# Patient Record
Sex: FEMALE | Race: WHITE | URBAN - METROPOLITAN AREA
[De-identification: names, ages, dates, MRNs, and addresses within clinical notes are randomized per-mention and may not be internally consistent; named-entity substitution may affect disease eponyms.]

---

## 2018-10-16 ENCOUNTER — TRANSFERRED RECORDS (OUTPATIENT)
Dept: HEALTH INFORMATION MANAGEMENT | Facility: CLINIC | Age: 53
End: 2018-10-16

## 2018-12-05 ENCOUNTER — OFFICE VISIT (OUTPATIENT)
Dept: OPHTHALMOLOGY | Facility: CLINIC | Age: 53
End: 2018-12-05
Attending: OPHTHALMOLOGY
Payer: COMMERCIAL

## 2018-12-05 DIAGNOSIS — H35.353 CYSTOID MACULAR DEGENERATION OF BOTH EYES: ICD-10-CM

## 2018-12-05 DIAGNOSIS — H35.52 RETINITIS PIGMENTOSA OF BOTH EYES: ICD-10-CM

## 2018-12-05 DIAGNOSIS — H35.50: ICD-10-CM

## 2018-12-05 DIAGNOSIS — H35.50: Primary | ICD-10-CM

## 2018-12-05 DIAGNOSIS — H35.52 RETINITIS PIGMENTOSA OF BOTH EYES: Primary | ICD-10-CM

## 2018-12-05 PROCEDURE — 40000269 ZZH STATISTIC NO CHARGE FACILITY FEE: Mod: ZF

## 2018-12-05 PROCEDURE — 92275 FFERG OU (BOTH EYES): CPT | Mod: ZF | Performed by: OPHTHALMOLOGY

## 2018-12-05 PROCEDURE — 92134 CPTRZ OPH DX IMG PST SGM RTA: CPT | Mod: ZF | Performed by: OPHTHALMOLOGY

## 2018-12-05 PROCEDURE — G0463 HOSPITAL OUTPT CLINIC VISIT: HCPCS | Mod: ZF,25

## 2018-12-05 PROCEDURE — 92250 FUNDUS PHOTOGRAPHY W/I&R: CPT | Mod: ZF | Performed by: OPHTHALMOLOGY

## 2018-12-05 RX ORDER — DORZOLAMIDE HCL 20 MG/ML
1 SOLUTION/ DROPS OPHTHALMIC 2 TIMES DAILY
Qty: 1 BOTTLE | Refills: 11 | Status: SHIPPED | OUTPATIENT
Start: 2018-12-05

## 2018-12-05 RX ORDER — ESCITALOPRAM OXALATE 20 MG/1
20 TABLET ORAL DAILY
Refills: 3 | COMMUNITY
Start: 2018-10-26

## 2018-12-05 ASSESSMENT — VISUAL ACUITY
OS_CC: 20/25
METHOD: SNELLEN - LINEAR
OD_CC+: -1
OD_CC+: -1
OS_CC+: +2
OD_CC: 20/25
OD_CC: 20/25
CORRECTION_TYPE: GLASSES
OS_CC+: +2
OS_CC: 20/25
METHOD: SNELLEN - LINEAR

## 2018-12-05 ASSESSMENT — CUP TO DISC RATIO
OD_RATIO: 0.35
OS_RATIO: 0.4

## 2018-12-05 ASSESSMENT — REFRACTION_WEARINGRX
OS_AXIS: 085
SPECS_TYPE: TRIFOCAL
OD_CYLINDER: +1.50
OD_ADD: +3.00
OS_ADD: +3.00
OS_ADD: +3.00
OD_AXIS: 060
SPECS_TYPE: TRIFOCAL
OD_ADD: +3.00
OD_SPHERE: -2.00
OS_CYLINDER: +1.00
OS_SPHERE: -1.75
OS_CYLINDER: +1.00
OD_SPHERE: -2.00
OS_AXIS: 085
OD_AXIS: 060
OS_SPHERE: -1.75
OD_CYLINDER: +1.50

## 2018-12-05 ASSESSMENT — CONF VISUAL FIELD
OD_NORMAL: 1
METHOD: COUNTING FINGERS
OS_NORMAL: 1

## 2018-12-05 ASSESSMENT — TONOMETRY
OS_IOP_MMHG: 18
IOP_METHOD: TONOPEN
OD_IOP_MMHG: 19

## 2018-12-05 ASSESSMENT — EXTERNAL EXAM - LEFT EYE: OS_EXAM: NORMAL

## 2018-12-05 ASSESSMENT — EXTERNAL EXAM - RIGHT EYE: OD_EXAM: NORMAL

## 2018-12-05 ASSESSMENT — SLIT LAMP EXAM - LIDS
COMMENTS: DERMATOCHALASIS
COMMENTS: DERMATOCHALASIS

## 2018-12-05 NOTE — MR AVS SNAPSHOT
After Visit Summary   12/5/2018    Deidra Garcia    MRN: [id number]           Patient Information     Date Of Birth          1965        Visit Information        Provider Department      12/5/2018 2:30 PM [facility]        Today's Diagnoses     Retinitis pigmentosa of both eyes           Follow-ups after your visit        Future tests that were ordered for you today     Open Future Orders        Priority Expected Expires Ordered    Zhang VF OU Routine  6/5/2020 12/5/2018            Who to contact     Please call your clinic at [phone] to:    Ask questions about your health    Make or cancel appointments    Discuss your medicines    Learn about your test results    Speak to your doctor            Additional Information About Your Visit        [employer] Information     [email or web address] gives you secure access to your electronic health record. If you see a primary care provider, you can also send messages to your care team and make appointments. If you have questions, please call your primary care clinic.  If you do not have a primary care provider, please call [phone] and they will assist you.      [email or web address] is an electronic gateway that provides easy, online access to your medical records. With [email or web address], you can request a clinic appointment, read your test results, renew a prescription or communicate with your care team.     To access your existing account, please contact your [facility] or call [phone] for assistance.        Care EveryWhere ID     This is your Care EveryWhere ID. This could be used by other organizations to access your [place] medical records  [id number]         Blood Pressure from Last 3 Encounters:   No data found for BP    Weight from Last 3 Encounters:   No data found for Wt              Today, you had the following     No orders found for display         Today's Medication Changes          These changes are  accurate as of 12/5/18  4:28 PM.  If you have any questions, ask your nurse or doctor.               Start taking these medicines.        Dose/Directions    dorzolamide 2 % ophthalmic solution   Commonly known as:  TRUSOPT   Used for:  Cystoid macular degeneration of both eyes, Retinitis pigmentosa of both eyes   Started by:  Brianna Thompson MD        Dose:  1 drop   Place 1 drop into the right eye 2 times daily   Quantity:  1 Bottle   Refills:  11            Where to get your medicines      These medications were sent to [place] [address] [place] [address] [address]     Phone:  [phone]     dorzolamide 2 % ophthalmic solution                Primary Care Provider Office Phone # Fax #    Nicky WOODARD KAREN Quevedo [phone]       [place] [address] [place] [id number]        Equal Access to Services     NATHAN PLAZA : Hadii joey hitchcock hadasho Soomaali, waaxda luqadaha, qaybta kaalmada adeegyada, mickey almanza . So [facility] [phone].    ATENCIÓN: Si habla español, tiene a kulkarni disposición servicios gratuitos de asistencia lingüística. Llame al [phone].    We comply with applicable federal civil rights laws and [place] laws. We do not discriminate on the basis of race, color, national origin, age, disability, sex, sexual orientation, or gender identity.            Thank you!     Thank you for choosing EYE CLINIC  for your care. Our goal is always to provide you with excellent care. Hearing back from our patients is one way we can continue to improve our services. Please take a few minutes to complete the written survey that you may receive in the mail after your visit with us. Thank you!             Your Updated Medication List - Protect others around you: Learn how to safely use, store and throw away your medicines at www.disposemymeds.org.          This list is accurate as of 12/5/18  4:28 PM.   Always use your most recent med list.                   Brand Name Dispense Instructions for use Diagnosis    dorzolamide 2 % ophthalmic solution    TRUSOPT    1 Bottle    Place 1 drop into the right eye 2 times daily    Cystoid macular degeneration of both eyes, Retinitis pigmentosa of both eyes       escitalopram 20 MG tablet    LEXAPRO     Take 20 mg by mouth daily

## 2018-12-05 NOTE — LETTER
2018    STANDARD ERG REPORT    Referring:  Brianna Thompson MD     RE:  Deidra Garcia  MRN:  [id number]  :  1965    ERG Date:  2018    CLINICAL HISTORY: Deidra Garcia is a 53-year-old patient with diagnosis of retinitis pigmentosa of both eyes, referred for a full-field electroretinogram (ERG).    IMPRESSION:  Jonathan-cone dystrophy                  Visual Acuity Right Eye : 20/25-1      w/gls, -2.00 + 1.50 x 060    Visual Acuity Left Eye : 20/25+2        w/gls, -1.75 +1.00 x 085                                ALL AVERAGED  Data for Full-Field ERG Right Eye   Left Eye    Dark-Adapted Patient Normal Patient   0.01 ERG (jonathan) amplitude( v)  ------ 95.4-392.1 ------   0.01 ERG (jonathan) implicit time(ms) ------ 71.7-100.1 ------   MMMMM      3.0 ERG (combined) a-wave amplitude( v) -43 -260.0 to -28.0 -39   3.0 ERG (combined) a-wave implicit time(ms) 38.3 12.2-21.1 30.8   3.0 ERG (combined) b-wave amplitude( v) 23 136.5-400.6 22   3.0 ERG (combined) b-wave implicit time(ms) 46.6 29.2-48.8 49.9   MMMMM      3.0 Oscillatory Potentials  Severely reduced Present Severely reduced    Light-Adapted      3.0 Flicker (30-Hz) amplitude( v) 22 55.9-187.0 41   3.0 Flicker (30-Hz) implicit time(ms) 34.1 20.6-28.6 34.9         3.0 ERG (cone) a-wave amplitude( v) -10 -62.2 to -10.6 -17   3.0 ERG (cone) a-wave implicit time(ms) 16.6 15.0-17.5 18.3   3.0 ERG (cone) b-wave amplitude( v) 41 69.9-205.4 57   3.0 ERG (cone) b-wave implicit time(ms) 39.1 24.6-31.0 39.1              *=manipulated cursors    INTERPRETATION:  This full-field electroretinogram was performed according to ISCEV standards using the ESPION E3 system and DTL fiber-recording electrodes. The patient tolerated the testing well. The waveforms are fairly reproducible. The normative values provided above represent the 95% confidence limits for a normal individual the age of the patient. The patient s responses are averaged. There is mild asymmetry of the responses  in between both eyes.    In dark-adapted conditions, the smitha-specific responses were non detectable. The maximal response, a combined smitha and cone response, has normal amplitudes for the a-wave and decreased amplitude for the b-wave in both eyes and the implicit time is delayed. With a higher intensity flash, the responses improve, but persistently reduced in both eyes. The bright flash (scotopic 10.0) response is not electronegative. The oscillatory potentials are severely reduced bilaterally.    In light adapted conditions, the 30-Hz flicker responses have a decreased amplitude and the implicit time is delayed in both eyes. The single photopic responses are abnormal in both eyes.    CONCLUSION: This represents an abnormal electroretinogram consistent with the diagnosis of Retinitis pigmentosa of both eyes. This electroretinogram shows moderate to severe loss of smitha/cone function. The etiology for this is unknown, but could be consistent with inherited retinal dystrophy. The differential diagnoses include: post-inflammatory retinopathy, toxic retinopathy and autoimmune retinopathy.     Consideration could be given to drawing blood for the retinal dystrophy panel with hopes of determining the mutations accounting for this retinal dystrophy.     Clinical correlation is recommended.    A repeat electroretinogram could be considered in 1-2 years, or earlier if the clinical situation changes.     Thank you for the opportunity to provide electrophysiologic services for this patient.  Please do not hesitate to call if there should be any questions regarding these results.    Brianna Thompson MD     Retina Service   Department of Ophthalmology & Visual Neurosciences   [facility]  Phone: [phone]   Fax: [phone]       cc:  Gerri Sung OD

## 2018-12-05 NOTE — MR AVS SNAPSHOT
After Visit Summary   12/5/2018    Deidra Garcia    MRN: [id number]           Patient Information     Date Of Birth          1965        Visit Information        Provider Department      12/5/2018 10:00 AM Brianna Thompson MD Eye Clinic        Today's Diagnoses     Retinitis pigmentosa of both eyes - Both Eyes    -  1    Jonathan dystrophy - Both Eyes        Cystoid macular degeneration of both eyes - Both Eyes           Follow-ups after your visit        Additional Services     ERG Referral                 Follow-up notes from your care team     Return in about 3 months (around 3/5/2019) for ffERG, dilated.      Future tests that were ordered for you today     Open Future Orders        Priority Expected Expires Ordered    Zhang VF OU Routine  6/5/2020 12/5/2018            Who to contact     Please call your clinic at [phone] to:    Ask questions about your health    Make or cancel appointments    Discuss your medicines    Learn about your test results    Speak to your doctor            Additional Information About Your Visit        [employer] Information     In[employer] gives you secure access to your electronic health record. If you see a primary care provider, you can also send messages to your care team and make appointments. If you have questions, please call your primary care clinic.  If you do not have a primary care provider, please call [phone] and they will assist you.      Innolight is an electronic gateway that provides easy, online access to your medical records. With [employer], you can request a clinic appointment, read your test results, renew a prescription or communicate with your care team.     To access your existing account, please contact your [facility] or call [phone] for assistance.        Care EveryWhere ID     This is your Care EveryWhere ID. This could be used by other organizations to access your [facility]  records  [id number]         Blood Pressure from Last 3 Encounters:   No data found for BP    Weight from Last 3 Encounters:   No data found for Wt              We Performed the Following     ERG Referral     FFERG OU (both eyes)     Fundus Autofluorescence Image (FAF) OU (both eyes)     Fundus Photos OU (both eyes)     OCT Retina Spectralis OU (both eyes)          Today's Medication Changes          These changes are accurate as of 12/5/18  4:32 PM.  If you have any questions, ask your nurse or doctor.               Start taking these medicines.        Dose/Directions    dorzolamide 2 % ophthalmic solution   Commonly known as:  TRUSOPT   Used for:  Cystoid macular degeneration of both eyes, Retinitis pigmentosa of both eyes   Started by:  Brianna Thompson MD        Dose:  1 drop   Place 1 drop into the right eye 2 times daily   Quantity:  1 Bottle   Refills:  11            Where to get your medicines      These medications were sent to [place] [address] [place] [address] [address]     Phone:  [phone]     dorzolamide 2 % ophthalmic solution                Primary Care Provider Office Phone # Fax #    Nicky WOODARD KAREN Quevedo [phone]       [address] [address]        Equal Access to Services     NATHAN PLAZA AH: Deep barkero Sonolvia, waaxda luqadaha, qaybta kaalmada adeegyada, mickey bowen. So [facility] [phone].    ATENCIÓN: Si habla español, tiene a kulkarni disposición servicios gratuitos de asistencia lingüística. Llame al [phone].    We comply with applicable federal civil rights laws and [place] laws. We do not discriminate on the basis of race, color, national origin, age, disability, sex, sexual orientation, or gender identity.            Thank you!     Thank you for choosing EYE CLINIC  for your care. Our goal is always to provide you with excellent care.  Hearing back from our patients is one way we can continue to improve our services. Please take a few minutes to complete the written survey that you may receive in the mail after your visit with us. Thank you!             Your Updated Medication List - Protect others around you: Learn how to safely use, store and throw away your medicines at www.disposemymeds.org.          This list is accurate as of 12/5/18  4:32 PM.  Always use your most recent med list.                   Brand Name Dispense Instructions for use Diagnosis    dorzolamide 2 % ophthalmic solution    TRUSOPT    1 Bottle    Place 1 drop into the right eye 2 times daily    Cystoid macular degeneration of both eyes, Retinitis pigmentosa of both eyes       escitalopram 20 MG tablet    LEXAPRO     Take 20 mg by mouth daily

## 2018-12-05 NOTE — PROGRESS NOTES
CC -   RP suspect referral [facility]    INTERVAL HISTORY - Initial visit    HPI - Deidra Garcia is a  53 year old year-old patient who noted change in visual acuity OD Spring 2018 (blurry vision). Has had nyctalopia and photophobia OU. Positive for photopsias. No floaters. Recent decreased hearing, mild. No significant medical history.    No history of ERG, no genetic testing.    RP in mother, 2 aunts, 1 cousin, grandfather, great grandmother.  Has 2 children- unclear if  Retinitis pigmentosa     PAST OCULAR SURGERY  NA    RETINAL IMAGING:  OCT   OD - 1+ CME, loss of parafoveal EZ  OS - Minimal CME, loss of parafoveal EZ    FAF  OD Parafoveal hyperautofluorescence OD > OS  OS Parafoveal hyperautofluorescence OD > OS    Fundus pic consistent with exam     ASSESSMENT & PLAN  1. Retinitis Pigmentosa both eyes  sine pigmento   - Nyctalopia, recent decrease VA OD   - CME OD > OS with parafoveal EZ loss and parafoveal hyperautofluorescence   - Recommend topical dorzolamide OD due to increased CME and subjective decrease in visual acuity OD   - ffERG in future   - [place]    - consider baseline ffelectroretinogram  And goldmann visual field (GVF)  - optos fundus pictures and optos autofluorescence next follow up     - Use of Vitamin A 36391 IU discussed with patient. recommend to check LFT two times per year with primary care physician if decides to start treatment.    - FFB registry information  You were diagnosed with an Inherited Retinal Disease named Retinitis pigmentosa. Additional information about your eye condition can be found at the [employer] website http://www.blindness.org/. You are encouraged to register on this website [email or web address] to keep you updated on the most resent clinical trials and research on your condition. This is a free on-line registry provided by the [employer] as part of its mission to  drive the research that will provide prevention, treatments, and cures for people affected by various inherited retinal diseases. The database that is developed on this website is designed to protect your privacy.  After you have registered as a participant, you have the option to invite your retinal healthcare providers (doctor or genetic counselor) to update your records. If you would like to have the genetic testing through the FFB; please contact us back again at [phone] after you register on [email or web address]    Other source of information:  Clinicaltrials.gov    return to clinic: 6 weeks ffERG and goldmann visual field (GVF)   Plan to start dorzolamide twice a day right eye only   Genetic counseling same day / Adult IRD clinic     Jimmie Zeng M.D.  PGY-3, Ophthalmology    ~~~~~~~~~~~~~~~~~~~~~~~~~~~~~~~~~~   Complete documentation of historical and exam elements from today's encounter can be found in the full encounter summary report (not reduplicated in this progress note).  I personally obtained the chief complaint(s) and history of present illness.  I confirmed and edited as necessary the review of systems, past medical/surgical history, family history, social history, and examination findings as documented by others; and I examined the patient myself.  I personally reviewed the relevant tests, images, and reports as documented above.  I personally reviewed the ophthalmic test(s) associated with this encounter, agree with the interpretation(s) as documented by the resident/fellow, and have edited the corresponding report(s) as necessary.   I formulated and edited as necessary the assessment and plan and discussed the findings and management plan with the patient and family    Brianna Thompson MD   of Ophthalmology.  Retina Service   Department of Ophthalmology and Visual Neurosciences   [facility]  Phone: [phone]   Fax: [phone]

## 2018-12-05 NOTE — PROGRESS NOTES
2018    STANDARD ERG REPORT    Referring:  Brianna Thompson MD     RE:  Deidra Garcia  MRN:  [id number]  :  1965    ERG Date:  2018    CLINICAL HISTORY: Deidra Garcia is a 53 year old year-old patient with diagnosis of Retinitis pigmentosa of both eyes, referred for a full field electroretinogram (ERG).    IMPRESSION:  Jonathan - cone dystrophy                  Visual Acuity Right Eye : 20/25-1      w/gls, -2.00 + 1.50x060    Visual Acuity Left Eye : 20/25+2        w/gls, -1.75 + 1.00x085                    ALL AVERAGED  Data for Full-Field ERG Right Eye   Left Eye    Dark-Adapted Patient Normal Patient   0.01 ERG (jonathan) amplitude( v)  ------ 95.4-392.1 ------   0.01 ERG (jonathan) implicit time(ms) ------ 71.7-100.1 ------   MMMMM      3.0 ERG (combined) a-wave amplitude( v) -43 -260.0 to -28.0 -39   3.0 ERG (combined) a-wave implicit time(ms) 38.3 12.2-21.1 30.8   3.0 ERG (combined) b-wave amplitude( v) 23 136.5-400.6 22   3.0 ERG (combined) b-wave implicit time(ms) 46.6 29.2-48.8 49.9   MMMMM      3.0 Oscillatory Potentials   Present     Light-Adapted      3.0 Flicker (30-Hz) amplitude( v) 22 55.9-187.0 41   3.0 Flicker (30-Hz) implicit time(ms) 34.1 20.6-28.6 34.9         3.0 ERG (cone) a-wave amplitude( v) -10 -62.2 to -10.6 -17   3.0 ERG (cone) a-wave implicit time(ms) 16.6 15.0-17.5 18.3   3.0 ERG (cone) b-wave amplitude( v) 41 69.9-205.4 57   3.0 ERG (cone) b-wave implicit time(ms) 39.1 24.6-31.0 39.1          *=manipulated cursors    INTERPRETATION:     This full field electroretinogram was performed according to ISCEV standards using ESPION E3 system and DTL fiber recording electrodes. The patient tolerated the testing well. The waveforms are fairly reproducible. The normative values provided above represent the 95% confidence limits for a normal individual the age of the patient. The patient s responses are averaged. There is mild asymmetry of the responses in between both eyes.    In dark adapted  conditions, the smitha-specific responses were non detectable.  The maximal response, a combined smitha and cone responses, have normal amplitudes for the a-wave and decreased amplitude for the b-wave in both eyes and the implicit time is delayed. With a higher intensity flash, the responses improve, but persistently reduced in both eyes.  The bright flash (scotopic 10.0) response is not electronegative.  The oscillatory potentials are severely reduced bilaterally.    In light adapted conditions, the 30-Hz flicker responses have a decreased amplitude and the implicit time is delayed in both eyes.    The single photopic responses are abnormal in both eyes.    Conclusion: This represents an abnormal electroretinogram consistent with the diagnosis of Retinitis pigmentosa of both eyes.   This electroretinogram shows moderate to severe loss of smitha/cone function. The etiology for this is unknown, but could be consistent with inherited retinal dystrophy. The differential diagnosis includes: post-inflammatory retinopathy, toxic retinopathy and Autoimmune Retinopathy     Consideration could be given to drawing blood for the retinal dystrophy panel, with hopes of determining the mutations accounting for this retinal dystrophy.    Clinical correlation is recommended.    A  repeat electroretinogram could be considered in 1-2 years or earlier if the clinical situation changes.     Thank you for the opportunity to provide electrophysiologic services for this patient.  Please do not hesitate to call if there should be any questions regarding these results.    Brianna Thompson MD     Retina Service   Department of Ophthalmology & Visual Neurosciences   [facility]  Phone: [phone]   Fax: [phone]

## 2018-12-05 NOTE — LETTER
12/5/2018       RE: Deidra Garcia  [address]  [facility] [address]     Dear Gerri,    Thank you for referring your patient, Deidra Garcia, to the EYE CLINIC at [facility]. Please see a copy of my visit note below.    CC -   RP suspect referral [facility]    INTERVAL HISTORY - Initial visit    HPI - Deidra Garcia is a  53 year old year-old patient who noted change in visual acuity OD Spring 2018 (blurry vision). Has had nyctalopia and photophobia OU. Positive for photopsias. No floaters. Recent decreased hearing, mild. No significant medical history.    No history of ERG, no genetic testing.    RP in mother, 2 aunts, 1 cousin, grandfather, great grandmother.  Has 2 children- unclear if  Retinitis pigmentosa     PAST OCULAR SURGERY  NA    RETINAL IMAGING:  OCT   OD - 1+ CME, loss of parafoveal EZ  OS - Minimal CME, loss of parafoveal EZ    FAF  OD Parafoveal hyperautofluorescence OD > OS  OS Parafoveal hyperautofluorescence OD > OS      ASSESSMENT & PLAN  1. Retinitis Pigmentosa both eyes  Mostly sine pigmento   - Nyctalopia, recent decrease VA OD   - CME OD > OS with parafoveal EZ loss and parafoveal hyperautofluorescence   - Recommend topical dorzolamide OD due to increased CME and subjective decrease in visual acuity OD   - ffERG in future   - [place], [place]    return to clinic: 3 months ffERG, dilated exam  Plan to start dorzolamide twice a day right eye only     - consider baseline ffelectroretinogram  And goldmann visual field (GVF)  - optos fundus pictures and optos autofluorescence next follow up     - Use of Vitamine A 04668 IU discussed with patient. recommend to check LFT two times per year with primary care physician if decides to start treatment.    - FFB registry information  You were diagnosed with an Inherited Retinal Disease named Retinitis pigmentosa. Additional information about your eye condition can be found at the  [employer] Blind[employer] website http://www.blindness.org/. You are encouraged to register on this website [email or web address] to keep you updated on the most resent clinical trials and research on your condition. This is a free on-line registry provided by the [employer] as part of its mission to drive the research that will provide prevention, treatments, and cures for people affected by various inherited retinal diseases. The database that is developed on this website is designed to protect your privacy.  After you have registered as a participant, you have the option to invite your retinal healthcare providers (doctor or genetic counselor) to update your records. If you would like to have the genetic testing through the FFB; please contact us back again at [phone] after you register on [email or web address]    Other source of information:  Clinicaltrials.gov    Again, thank you for allowing me to participate in the care of your patient.      Sincerely,    Brianna Thompson MD     Retina Service   Department of Ophthalmology and Visual Neurosciences   [facility]  Phone:  [phone]   Fax:  [phone]

## 2018-12-05 NOTE — NURSING NOTE
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 Pt mentions seeing flashes of light in BE, mostly right eye, when bending down, has been going on for the last year or so. She feels RE is more blurry than LE.     Family history--mother has RP.     KATHIE Freedman 10:18 AM    I have reviewed all information that was taken.  Jen LEE 10:21 AM December 5, 2018

## 2019-11-03 ENCOUNTER — HEALTH MAINTENANCE LETTER (OUTPATIENT)
Age: 54
End: 2019-11-03

## 2020-11-16 ENCOUNTER — HEALTH MAINTENANCE LETTER (OUTPATIENT)
Age: 55
End: 2020-11-16

## 2021-01-15 ENCOUNTER — HEALTH MAINTENANCE LETTER (OUTPATIENT)
Age: 56
End: 2021-01-15

## 2021-09-18 ENCOUNTER — HEALTH MAINTENANCE LETTER (OUTPATIENT)
Age: 56
End: 2021-09-18

## 2022-01-08 ENCOUNTER — HEALTH MAINTENANCE LETTER (OUTPATIENT)
Age: 57
End: 2022-01-08

## 2022-05-19 NOTE — NURSING NOTE
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  Pt states rxd gtts today for swelling noted retina right eye.  Signed up for [employer] and desires to correspond w/MDs regarding next steps for swelling f/u and ERG results.   Patient seen treated and released in intake. Patient swab for COVID-19 and dc by MD/ACP./DC instructions

## 2022-11-19 ENCOUNTER — HEALTH MAINTENANCE LETTER (OUTPATIENT)
Age: 57
End: 2022-11-19

## 2023-04-15 ENCOUNTER — HEALTH MAINTENANCE LETTER (OUTPATIENT)
Age: 58
End: 2023-04-15